# Patient Record
(demographics unavailable — no encounter records)

---

## 2025-01-06 NOTE — END OF VISIT
[TextEntry] : IKimberly, am scribing for and the presence of Dr. Brie Patten the following sections HISTORY OF PRESENT ILLNESS, PAST MEDICAL/FAMILY/SOCIAL HISTORY; REVIEW OF SYSTEMS; PHYSICAL EXAM; DISPOSITION.

## 2025-01-06 NOTE — PHYSICAL EXAM
[Acute Distress] : no acute distress [Mass] : no breast mass [Nipple Discharge] : no nipple discharge [Axillary LAD] : no axillary lymphadenopathy [Tender] : non tender [Distended] : not distended